# Patient Record
Sex: MALE | Race: WHITE | Employment: FULL TIME | URBAN - METROPOLITAN AREA
[De-identification: names, ages, dates, MRNs, and addresses within clinical notes are randomized per-mention and may not be internally consistent; named-entity substitution may affect disease eponyms.]

---

## 2022-04-06 ENCOUNTER — HOSPITAL ENCOUNTER (OUTPATIENT)
Age: 57
Setting detail: OUTPATIENT SURGERY
Discharge: HOME | End: 2022-04-06
Attending: INTERNAL MEDICINE | Admitting: INTERNAL MEDICINE
Payer: COMMERCIAL

## 2022-04-06 VITALS
RESPIRATION RATE: 16 BRPM | HEIGHT: 68 IN | HEART RATE: 78 BPM | SYSTOLIC BLOOD PRESSURE: 110 MMHG | OXYGEN SATURATION: 96 % | WEIGHT: 240.7 LBS | DIASTOLIC BLOOD PRESSURE: 71 MMHG | TEMPERATURE: 97.8 F | BODY MASS INDEX: 36.48 KG/M2

## 2022-04-06 PROCEDURE — 74011250636 HC RX REV CODE- 250/636: Performed by: INTERNAL MEDICINE

## 2022-04-06 PROCEDURE — 88305 TISSUE EXAM BY PATHOLOGIST: CPT

## 2022-04-06 PROCEDURE — 77030040361 HC SLV COMPR DVT MDII -B: Performed by: INTERNAL MEDICINE

## 2022-04-06 PROCEDURE — 76040000007: Performed by: INTERNAL MEDICINE

## 2022-04-06 PROCEDURE — 77030039961 HC KT CUST COLON BSC -D: Performed by: INTERNAL MEDICINE

## 2022-04-06 PROCEDURE — 2709999900 HC NON-CHARGEABLE SUPPLY: Performed by: INTERNAL MEDICINE

## 2022-04-06 PROCEDURE — G0500 MOD SEDAT ENDO SERVICE >5YRS: HCPCS | Performed by: INTERNAL MEDICINE

## 2022-04-06 PROCEDURE — 77030021593 HC FCPS BIOP ENDOSC BSC -A: Performed by: INTERNAL MEDICINE

## 2022-04-06 RX ORDER — SODIUM CHLORIDE 9 MG/ML
125 INJECTION, SOLUTION INTRAVENOUS CONTINUOUS
Status: DISCONTINUED | OUTPATIENT
Start: 2022-04-06 | End: 2022-04-06 | Stop reason: HOSPADM

## 2022-04-06 RX ORDER — FENTANYL CITRATE 50 UG/ML
INJECTION, SOLUTION INTRAMUSCULAR; INTRAVENOUS AS NEEDED
Status: DISCONTINUED | OUTPATIENT
Start: 2022-04-06 | End: 2022-04-06 | Stop reason: HOSPADM

## 2022-04-06 RX ORDER — ROSUVASTATIN CALCIUM 40 MG/1
40 TABLET, COATED ORAL DAILY
COMMUNITY
Start: 2022-03-22

## 2022-04-06 RX ORDER — ROPINIROLE 0.25 MG/1
0.25 TABLET, FILM COATED ORAL 3 TIMES DAILY
COMMUNITY

## 2022-04-06 RX ORDER — LANOLIN ALCOHOL/MO/W.PET/CERES
CREAM (GRAM) TOPICAL
COMMUNITY

## 2022-04-06 RX ORDER — CHLORPHENIRAMINE MALEATE 4 MG
4 TABLET ORAL
COMMUNITY

## 2022-04-06 RX ORDER — MULTIVIT WITH MINERALS/HERBS
1 TABLET ORAL DAILY
COMMUNITY

## 2022-04-06 RX ORDER — SPIRONOLACTONE 25 MG
600 TABLET ORAL
COMMUNITY

## 2022-04-06 RX ORDER — MIDAZOLAM HYDROCHLORIDE 1 MG/ML
INJECTION, SOLUTION INTRAMUSCULAR; INTRAVENOUS AS NEEDED
Status: DISCONTINUED | OUTPATIENT
Start: 2022-04-06 | End: 2022-04-06 | Stop reason: HOSPADM

## 2022-04-06 RX ORDER — BISMUTH SUBSALICYLATE 262 MG
1 TABLET,CHEWABLE ORAL DAILY
COMMUNITY

## 2022-04-06 RX ORDER — ATOMOXETINE 80 MG/1
80 CAPSULE ORAL DAILY
COMMUNITY
Start: 2022-02-18

## 2022-04-06 NOTE — DISCHARGE INSTRUCTIONS
Yasmany Browncarmen  [id number]  1965    EGD DISCHARGE INSTRUCTIONS  Discomfort:  Sore throat- throat lozenges or warm salt water gargle  redness at IV site- apply warm compress to area; if redness or soreness persist- contact your physician  Gaseous discomfort- walking, belching will help relieve any discomfort  You may not operate a vehicle until the next day  You may not engage in an occupation involving machinery or appliances until the next day  You may not drink alcoholic beverages until the next day  Avoid making any critical decisions for at least 24 hour    DIET   You may not resume your regular diet. Antireflux diet. ACTIVITY  You may not resume your normal daily activities   Spend the remainder of the day resting -  avoid any strenuous activity. CALL M.D. ANY SIGN OF   Increasing pain, nausea, vomiting  Abdominal distension (swelling)  New increased bleeding (oral or rectal)  Fever (chills)  Pain in chest area  Bloody discharge from nose or mouth  Shortness of breath     You may not take any Advil, Aspirin, Ibuprofen, Motrin, Aleve, or Goodys  ONLY  Tylenol as needed for pain. Follow-up Instructions:   Would still need to have his colonoscopy as planned     Carlie Staley MD  April 6, 2022      DISCHARGE SUMMARY from Nurse    PATIENT INSTRUCTIONS:    After general anesthesia or intravenous sedation, for 24 hours or while taking prescription Narcotics:  · Limit your activities  · Do not drive and operate hazardous machinery  · Do not make important personal or business decisions  · Do  not drink alcoholic beverages  · If you have not urinated within 8 hours after discharge, please contact your surgeon on call.     Report the following to your surgeon:  · Excessive pain, swelling, redness or odor of or around the surgical area  · Temperature over 100.5  · Nausea and vomiting lasting longer than 4 hours or if unable to take medications  · Any signs of decreased circulation or nerve impairment to extremity: change in color, persistent  numbness, tingling, coldness or increase pain  · Any questions    What to do at Home:  Recommended activity: Activity as tolerated and no driving for today. If you experience any of the following symptoms as above, please follow up with Dr. Derrick Lee. *  Please give a list of your current medications to your Primary Care Provider. *  Please update this list whenever your medications are discontinued, doses are      changed, or new medications (including over-the-counter products) are added. *  Please carry medication information at all times in case of emergency situations. These are general instructions for a healthy lifestyle:    No smoking/ No tobacco products/ Avoid exposure to second hand smoke  Surgeon General's Warning:  Quitting smoking now greatly reduces serious risk to your health. Obesity, smoking, and sedentary lifestyle greatly increases your risk for illness    A healthy diet, regular physical exercise & weight monitoring are important for maintaining a healthy lifestyle    You may be retaining fluid if you have a history of heart failure or if you experience any of the following symptoms:  Weight gain of 3 pounds or more overnight or 5 pounds in a week, increased swelling in our hands or feet or shortness of breath while lying flat in bed. Please call your doctor as soon as you notice any of these symptoms; do not wait until your next office visit. The discharge information has been reviewed with the patient and spouse. The patient and spouse verbalized understanding. Discharge medications reviewed with the patient and spouse and appropriate educational materials and side effects teaching were provided.   ___________________________________________________________________________________________________________________________________    Patient armband removed and shredded

## 2022-04-06 NOTE — PROCEDURES
(EGD) Esophagogastroduodenoscopy (UPPER ENDOSCOPY) Procedure Note  [facility] [place]  __________________________________________________________________________________________________________________________      4/6/2022     Patient: Renaldo Roldan YOB: 1965 Gender: male Age: 49 y.o. INDICATION:  : Pt of Dr. Celia Skiff, referred to [facility] for new onset of mild ALFIE, that was identified in July of 2021, with no surgical hx, no blood seen in stool or hematemesis, or obvious source of bleeding and no Upper or lower GI symptoms. No prior hx of PUD. Denied taking NSAID's. S/S of Anemia: Fatigue  *Lab Results: (Referral Documents)-  1/13/2021: No Anemia Noted  7/27/2021: RBC- , HGB- 12.5L, HCT- 36.5L  8/16/2021: RBC- , HGB- 12.5L, HCT- 36.5L, MCV- 93.1, ESR- 7  Iron- 112, %Sat- 30, TIBC- 370, Ferritin- 28L, Folate- >20, Vit B12- 833  Never had an EGD before. No recent blood tests  Last colonoscopy (1st exam) was done about 8yrs ago @[facility] [place]: Negative exam, 10yr Recall (Per Pt). -Old records unavailable at this time, any records recovered will be scanned to chart at later date. No FHx of (CRC) colorectal cancer, or Gastric Ulcers. Asymptomatic of GI complaints. BM: 1/day. No reported hx of abdominal surgeries, strokes, or CAD. : Florencia Dawson MD    Referring Provider:  Evelin Elliott MD    Sedation:  Versed 7 mg IV, Fentanyl 100 mcg IV    Procedure Details:  After infomed consent was obtained for the procedure, with all risks and benefits of procedure explained to the family the patient was taken to the endoscopy suite and placed in the left lateral decubitus position. Following sequential administration of sedation as per above, the endoscope was inserted into the mouth and advanced under direct vision to third portion of the duodenum.   A careful inspection was made as the gastroscope was withdrawn, including a retroflexed view of the proximal stomach; findings and interventions are described below. OROPHARYNX: The vocal Cords and the larynx are normal.   ESOPHAGUS: The proximal and mid oesophagus are normal. There is mild distal esophageal stenosis but the patient claimed that he has no dysphagia. The Z-Line is intact. No Hiatal hernia. Diaphragmatic opening or notch is located at 40 cm. STOMACH: No evidence of blood, fluid or solid food retention. The fundus on antegrade and retroflex views is normal. The cardia, body, lesser curvature, greater curvature, the antrum, and pylorus are normal. The gastric mucosa is normal.  DUODENUM: The bulb and major papilla are normal. There is mild non specific serpiginous like duodnenits in the second and third portions of the duodenum. 4 biopsies taken. Bleeds easily. PROXIMAL JEJUNUM:  Not examined. Therapies:  4 Duodenal biopsies     Specimen: one           Complications:   None    EBL: Negligible. IMPLANTS: * No implants in log *  IMPRESSION: Mild distal esophageal stenosis but the patient denied having dysphagia. The Z-Line is intact. No Hiatal hernia. There is mild non specific serpiginous like duodnenits in the second and third portions of the duodenum. 4 biopsies taken. Bleeds easily after each biopsy. RECOMMENDATION:  May resume regular diet. Chew well food. Avoid all NSAID's. Make a FU appointment at the office. Do the colonoscopy as planned.  Need to repeat CBC, iron profile, Ferritin and CMP  Assistant: None    --Jessi Qiuñonez MD on 4/6/2022 at 4:35 PM

## 2022-04-06 NOTE — H&P
Assessment/Plan  # Detail Type Description    1. Assessment Iron deficiency anemia, unspecified (D50.9). Impression 12/30/2021:   Pt of Dr. Ronaldo Durand, referred to [facility] for evaluation and treatment of new onset of ALFIE, that was identified in July of 2021, with no surgical hx, no blood seen in stool or hematemesis, or obvious source of bleeding.  _________________________________  *S/S of Anemia: Fatigue  *Lab Results: (Referral Documents)-  1/13/2021: No Anemia Noted  7/27/2021: RBC- , HGB- 12.5L, HCT- 36.5L  8/16/2021: RBC- , HGB- 12.5L, HCT- 36.5L, MCV- 93.1, ESR- 7  Iron- 112, %Sat- 30, TIBC- 370, Ferritin- 38L, Folate- >20, Vit B12- 833  _________________________________  *Never had an EGD before. *Last colonoscopy (1st exam) was done about 8yrs ago @[facility] [place]: Negative exam, 10yr Recall (Per Pt). -Old records unavailable at this time, any records recovered will be scanned to chart at later date.  _________________________________  Average risk, no FHx of (CRC) colorectal cancer, or Gastric Ulcers. Asymptomatic of GI complaints. BM: 1/day. No reported hx of abdominal surgeries, strokes, or CAD. Patient Plan 12/30/2021:  *Overall Plan:   1) EGD  2) Proceed w/C-scope if EGD is non-contributory (May cancel, if not needed after EGD)  _________________________________________  *EGD Plan: (Schedule 1st)  Will proceed with EGD with Dr. Susannah Cardona, to evaluate upper GI tract for abnormalities, evidence of bleeding, and Amador's epithelium with biopsies as indicated. *EGD Risks:  Explained risks of procedure to include bleeding, infection, reaction to sedation, and perforation with possible need for admission to the hospital, and in the most extensive of  circumstances, the patient may require surgery.  Pt verbalized understanding of these risks and is agreeable with this procedure.  ______________________________________  *C-scope Plan: (Schedule 2nd) - Proceed w/C-scope if EGD is non-contributory  Colonoscopy ordered with Dr. Lima Gardner with Miralax bowel prep, and Mag Citrate 2 days before prep, and Miralax and stool softeners starting 3 days before prep.  -Patient is advised to take Thyroid meds, BP meds, beta blocker and any cardiac meds the AM of procedure with sip clear liquid after prep.  -Pt must be off iron supplements at least 7 days prior to procedure. *C-scope Risks:  Stressed importance of following all bowel preparation instructions. Explained the procedure to the patient including all risks and benefits. These risks consist of missed lesions on exam, bleeding, and bowel perforation with possible need for admission to the hospital, and in the most extensive of  circumstances, the patient may require surgery. Pt verbalized understanding of these risks and is agreeable with this procedure. Plan Orders Further diagnostic evaluations ordered today include(s) DIAGNOSTIC COLONOSCOPY to be performed. and UPPER GI ENDOSCOPY, DIAGNOSIS to be performed. 2. Assessment Long term (current) use of non-steroidal anti-inflammatories (NSAID) (Z79.1). Impression Chronic NSAID use: Taking 400mg Ibuprofen BID (800mg Daily) for Left Elbow pain. 3. Assessment Body mass index [BMI] 37.0-37.9, adult (Z68.37). Impression BMI: 37.40, Pt is obese with an Average Frame. These factors increase risks of procedural complications with sedation, and compromise airway maintenance. Special attention to airway management. This 49year old male presents for Anemia. History of Present Illness  1. Anemia   The symptom(s) began 4 months ago. Type of anemia was acquired for deficiency anemia (iron deficient). The problem is a new diagnosis. Associated symptoms include fatigue. Pertinent negatives include dizziness and weakness. Additional information: Pt stated that his last colonoscopy  was approx  8 yrs ago [facility]  Normal exam. Bm.  1 daily.            Comments: 2021 HGB 12.5, hematocrit 36.5, Ferritin 28    Past Medical/Surgical History   (Detailed)  Disease/disorder Onset Date Management Date Comments   Chronic NSAID use             Family History   (Detailed)    Relationship Family Member Name  Age at Death Condition Onset Age Cause of Death       No family history of (CRC) colorectal cancer, or Gastric ulcers. N     Social History  (Detailed)  Tobacco use reviewed. Preferred language is [place]. Marital Status/Family/Social Support  Marital status:      Alcohol  There is a history of alcohol use. Caffeine  The patient uses caffeine. Medications (active prior to today)  Medication Instructions Start Date Stop Date Refilled Elsewhere   Crestor 40 mg tablet take 1 tablet by oral route  every day //   Y   atomoxetine 80 mg capsule take 1 capsule by oral route  every day //   Y   ropinirole 0.5 mg tablet take 1 tablet by oral route 3 times every day //   Y   Chlor-Trimeton 4 mg tablet take 1 tablet by oral route  every 4 hours as needed //   Y   vitamin B complex capsule  //   Y       Medication Reconciliation  Medications reconciled today.     Medication Reviewed  Adherence Medication Name Sig Desc Elsewhere Status   taking as directed Crestor 40 mg tablet take 1 tablet by oral route  every day Y Verified   taking as directed atomoxetine 80 mg capsule take 1 capsule by oral route  every day Y Verified   taking as directed ropinirole 0.5 mg tablet take 1 tablet by oral route 3 times every day Y Verified   taking as directed Chlor-Trimeton 4 mg tablet take 1 tablet by oral route  every 4 hours as needed Y Verified   taking as directed vitamin B complex capsule  Y Verified     Medications (Added, Continued or Stopped today)  Start Date Medication Directions PRN Status PRN Reason Instruction Stop Date    atomoxetine 80 mg capsule take 1 capsule by oral route  every day N       Chlor-Trimeton 4 mg tablet take 1 tablet by oral route  every 4 hours as needed N       Crestor 40 mg tablet take 1 tablet by oral route  every day N       ropinirole 0.5 mg tablet take 1 tablet by oral route 3 times every day N       vitamin B complex capsule  N        Allergies  Ingredient Reaction (Severity) Medication Name Comment   NO KNOWN ALLERGIES        Reviewed, no changes. Orders  Status Lab Order Time Frame Comments   ordered UPPER GI ENDOSCOPY, DIAGNOSIS     ordered DIAGNOSTIC COLONOSCOPY         Review of Systems  System Neg/Pos Details   Constitutional Positive Fatigue. Constitutional Negative Fever and Malaise. ENMT Negative Ear infections, Nasal congestion, Sinus Infection and Sore throat. Eyes Negative Double vision and Eye pain. Respiratory Negative Asthma, Chronic cough, Pleuritic pain and Wheezing. Cardio Negative Edema and Irregular heartbeat/palpitations. GI Negative Change in bowel habits, Decreased appetite, Dysphagia, Heartburn, Hematemesis, Hematochezia, Melena and Reflux.  Negative Dysuria, Hematuria, Urinary frequency, Urinary incontinence and Urinary retention. Endocrine Negative Gynecomastia, Heat intolerance and Increased thirst.   Neuro Negative Dizziness, Tremors and Vertigo. Psych Negative Anxiety and Increased stress. Integumentary Negative Hives, Pruritus and Rash. MS Negative Back pain, Myalgia and Weakness. Hema/Lymph Negative Easy bleeding, Easy bruising and Lymphadenopathy. Allergic/Immuno Negative Chemicals in work place, Contact allergy, Food allergies, Immunosuppression and Seasonal allergies. Reproductive Negative Penile discharge and Sexual dysfunction. Vital Signs   Height  Time ft in cm Last Measured Height Position   10:50 AM 5.0 8.00 172.72       Weight/BSA/BMI  Time lb oz kg Context BMI kg/m2 BSA m2   10:50 .00  111. 584 dressed with shoes 37.40      BP (mmHg) BP Patient Position Resp SpO2 O2 Device O2 Flow Rate (L/min) Level of Consciousness MEWS Score Weight       04/06/22 1351 97.6 °F (36.4 °C) 86 120/73   14 98 % None (Room air)  Alert (0) 0 109.2 kg (240 lb 11.2 oz)       Physical  Exam  Exam Findings Details   Constitutional Normal Well developed. Eyes Normal Conjunctiva - Right: Normal, Left: Normal. Sclera - Right: Normal, Left: Normal.   Nasopharynx Normal Lips/teeth/gums - Normal.   Neck Exam Normal Inspection - Normal.   Respiratory Normal Inspection - Normal.   Cardiovascular Normal Rhythm - Regular. Extra sounds - None. Murmurs - None. Vascular Normal Pulses - Brachial: Normal.   Skin Normal Inspection - Normal.   Musculoskeletal Normal Hands/Wrist - Right: Normal, Left: Normal.   Extremity Normal No edema. Neurological Normal Fine motor skills - Normal.   Psychiatric Normal Orientation - Oriented to time, place, person & situation. Appropriate mood and affect.      Active Patient Care Team Members  Name Contact Agency Type Support Role Relationship Active Date Inactive Date Specialty   Chelsea Claude   Patient provider PCP      Christy Eduardo   encounter provider    Gastroenterology       No change in H&P

## 2022-04-25 ENCOUNTER — HOSPITAL ENCOUNTER (OUTPATIENT)
Age: 57
Setting detail: OUTPATIENT SURGERY
Discharge: HOME | End: 2022-04-25
Attending: INTERNAL MEDICINE | Admitting: INTERNAL MEDICINE
Payer: COMMERCIAL

## 2022-04-25 VITALS
SYSTOLIC BLOOD PRESSURE: 124 MMHG | BODY MASS INDEX: 36.22 KG/M2 | RESPIRATION RATE: 14 BRPM | TEMPERATURE: 98.2 F | HEIGHT: 68 IN | WEIGHT: 239 LBS | HEART RATE: 70 BPM | DIASTOLIC BLOOD PRESSURE: 71 MMHG | OXYGEN SATURATION: 99 %

## 2022-04-25 PROCEDURE — G0500 MOD SEDAT ENDO SERVICE >5YRS: HCPCS | Performed by: INTERNAL MEDICINE

## 2022-04-25 PROCEDURE — 88305 TISSUE EXAM BY PATHOLOGIST: CPT

## 2022-04-25 PROCEDURE — 77030039961 HC KT CUST COLON BSC -D: Performed by: INTERNAL MEDICINE

## 2022-04-25 PROCEDURE — 2709999900 HC NON-CHARGEABLE SUPPLY: Performed by: INTERNAL MEDICINE

## 2022-04-25 PROCEDURE — 77030013991 HC SNR POLYP ENDOSC BSC -A: Performed by: INTERNAL MEDICINE

## 2022-04-25 PROCEDURE — 76040000008: Performed by: INTERNAL MEDICINE

## 2022-04-25 PROCEDURE — 99153 MOD SED SAME PHYS/QHP EA: CPT | Performed by: INTERNAL MEDICINE

## 2022-04-25 PROCEDURE — 77030040361 HC SLV COMPR DVT MDII -B: Performed by: INTERNAL MEDICINE

## 2022-04-25 PROCEDURE — 74011250636 HC RX REV CODE- 250/636: Performed by: INTERNAL MEDICINE

## 2022-04-25 RX ORDER — FENTANYL CITRATE 50 UG/ML
100 INJECTION, SOLUTION INTRAMUSCULAR; INTRAVENOUS
Status: DISCONTINUED | OUTPATIENT
Start: 2022-04-25 | End: 2022-04-25 | Stop reason: HOSPADM

## 2022-04-25 RX ORDER — NALOXONE HYDROCHLORIDE 0.4 MG/ML
0.4 INJECTION, SOLUTION INTRAMUSCULAR; INTRAVENOUS; SUBCUTANEOUS
Status: DISCONTINUED | OUTPATIENT
Start: 2022-04-25 | End: 2022-04-25 | Stop reason: HOSPADM

## 2022-04-25 RX ORDER — DEXTROMETHORPHAN/PSEUDOEPHED 2.5-7.5/.8
1.2 DROPS ORAL
Status: CANCELLED | OUTPATIENT
Start: 2022-04-25

## 2022-04-25 RX ORDER — DIPHENHYDRAMINE HYDROCHLORIDE 50 MG/ML
50 INJECTION, SOLUTION INTRAMUSCULAR; INTRAVENOUS ONCE
Status: CANCELLED | OUTPATIENT
Start: 2022-04-25 | End: 2022-04-25

## 2022-04-25 RX ORDER — ATROPINE SULFATE 0.1 MG/ML
0.5 INJECTION INTRAVENOUS
Status: CANCELLED | OUTPATIENT
Start: 2022-04-25 | End: 2022-04-26

## 2022-04-25 RX ORDER — SODIUM CHLORIDE 0.9 % (FLUSH) 0.9 %
5-40 SYRINGE (ML) INJECTION EVERY 8 HOURS
Status: CANCELLED | OUTPATIENT
Start: 2022-04-25

## 2022-04-25 RX ORDER — EPINEPHRINE 0.1 MG/ML
1 INJECTION INTRACARDIAC; INTRAVENOUS
Status: CANCELLED | OUTPATIENT
Start: 2022-04-25 | End: 2022-04-26

## 2022-04-25 RX ORDER — SODIUM CHLORIDE 0.9 % (FLUSH) 0.9 %
5-40 SYRINGE (ML) INJECTION AS NEEDED
Status: CANCELLED | OUTPATIENT
Start: 2022-04-25

## 2022-04-25 RX ORDER — MIDAZOLAM HYDROCHLORIDE 1 MG/ML
.25-5 INJECTION, SOLUTION INTRAMUSCULAR; INTRAVENOUS
Status: DISCONTINUED | OUTPATIENT
Start: 2022-04-25 | End: 2022-04-25 | Stop reason: HOSPADM

## 2022-04-25 RX ORDER — SODIUM CHLORIDE 9 MG/ML
1000 INJECTION, SOLUTION INTRAVENOUS CONTINUOUS
Status: DISCONTINUED | OUTPATIENT
Start: 2022-04-25 | End: 2022-04-25 | Stop reason: HOSPADM

## 2022-04-25 RX ORDER — FLUMAZENIL 0.1 MG/ML
0.2 INJECTION INTRAVENOUS
Status: DISCONTINUED | OUTPATIENT
Start: 2022-04-25 | End: 2022-04-25 | Stop reason: HOSPADM

## 2022-04-25 RX ADMIN — SODIUM CHLORIDE 1000 ML: 9 INJECTION, SOLUTION INTRAVENOUS at 13:06

## 2022-04-25 NOTE — DISCHARGE INSTRUCTIONS
Izaiah Cruz  [id number]  1965    COLON DISCHARGE INSTRUCTIONS    Discomfort:  Redness at IV site- apply warm compress to area; if redness or soreness persist- contact your physician  There may be a slight amount of blood passed from the rectum  Gaseous discomfort- walking, belching will help relieve any discomfort  You may not operate a vehicle til the next day. You may not engage in an occupation involving machinery or appliances til the next day. You may not drink alcoholic beverages til the next day. DIET:   High fiber diet. ACTIVITY:  You may not  resume your normal daily activities til the next day. it is recommended that you spend the remainder of the day resting -  avoid any strenuous activity. CALL M.D.  IF ANY SIGN OF:   Increasing pain, nausea, vomiting  Abdominal distension (swelling)  New increased bleeding (oral or rectal)  Fever (chills)  Pain in chest area  Bloody discharge from nose or mouth  Shortness of breath    You may not  take any Advil, Aspirin, Ibuprofen, Motrin, Aleve, or Goodys for 5 days, ONLY  Tylenol as needed for pain. Post procedure diagnosis:  sigmoid diverticulosis; redundant colon; polyp    Follow-up Instructions: Your follow up colonoscopy will be in 10 years. We will notify you the results of your biopsy by letter within 2 weeks. Solitario Manning MD  April 25, 2022     DISCHARGE SUMMARY from Nurse    PATIENT INSTRUCTIONS:    After general anesthesia or intravenous sedation, for 24 hours or while taking prescription Narcotics:  · Limit your activities  · Do not drive and operate hazardous machinery  · Do not make important personal or business decisions  · Do  not drink alcoholic beverages  · If you have not urinated within 8 hours after discharge, please contact your surgeon on call.     Report the following to your surgeon:  · Excessive pain, swelling, redness or odor of or around the surgical area  · Temperature over 100.5  · Nausea and vomiting lasting longer than 4 hours or if unable to take medications  · Any signs of decreased circulation or nerve impairment to extremity: change in color, persistent  numbness, tingling, coldness or increase pain  · Any questions    What to do at Home:  Recommended activity: Activity as tolerated, as above    If you experience any of the following symptoms as above, please follow up with Dr. Bang Flores. *  Please give a list of your current medications to your Primary Care Provider. *  Please update this list whenever your medications are discontinued, doses are      changed, or new medications (including over-the-counter products) are added. *  Please carry medication information at all times in case of emergency situations. These are general instructions for a healthy lifestyle:    No smoking/ No tobacco products/ Avoid exposure to second hand smoke  Surgeon General's Warning:  Quitting smoking now greatly reduces serious risk to your health. Obesity, smoking, and sedentary lifestyle greatly increases your risk for illness    A healthy diet, regular physical exercise & weight monitoring are important for maintaining a healthy lifestyle    You may be retaining fluid if you have a history of heart failure or if you experience any of the following symptoms:  Weight gain of 3 pounds or more overnight or 5 pounds in a week, increased swelling in our hands or feet or shortness of breath while lying flat in bed. Please call your doctor as soon as you notice any of these symptoms; do not wait until your next office visit. The discharge information has been reviewed with the patient and spouse. The patient and spouse verbalized understanding. Discharge medications reviewed with the patient and spouse and appropriate educational materials and side effects teaching were provided.   _Patient armband removed and shredded  __________________________________________________________________________________________________________________________________

## 2022-04-25 NOTE — PROCEDURES
[facility]  Colonoscopy Procedure Report  _______________________________________________________  Patient: Lolita Gomes                                        Attending Physician: Jessica Yousif MD    Patient ID: [id number]                                    Referring Physician: Carolynn Lindsey MD    Exam Date: 4/25/2022      Introduction: A  49 y.o. male patient, presents for inpatient Colonoscopy    Indications: Pt of Dr. Saintclair Peek, referred to [facility] for evaluation and treatment of new onset of ALFIE, that was identified in July of 2021, with no surgical hx, no blood seen in stool or hematemesis, or obvious source of bleeding. S/S of Anemia: Fatigue  Lab Results: (Referral Documents)-  1/13/2021: No Anemia Noted  7/27/2021: RBC- , HGB- 12.5L, HCT- 36.5L  8/16/2021: RBC- , HGB- 12.5L, HCT- 36.5L, MCV- 93.1, ESR- 7  Iron- 112, %Sat- 30, TIBC- 370, Ferritin- 38L, Folate- >20, Vit B12- 833  Never had an EGD before. Last colonoscopy (1st exam) was done about 8yrs ago @[facility] [place]: Negative exam, 10yr Recall (Per Pt). -Old records unavailable at this time, any records recovered will be scanned to chart at later date. Average risk, no FHx of (CRC) colorectal cancer, or Gastric Ulcers. Asymptomatic of GI complaints. BM: 1/day. Consent: The benefits, risks, and alternatives to the procedure were discussed and informed consent was obtained from the patient. Preparation: EKG, pulse, pulse oximetry and blood pressure were monitored throughout the procedure. ASA Classification: Class I- . The heart is an S1-S2 and regular heart rate and rhythm. Lungs are clear to auscultation and percussion. Abdomen is soft, nondistended, and nontender. Mental Status: awake, alert, and oriented to person, place, and time    Medications:  · Fentanyl 100 mcg IV before procedure. · Versed 5 mg IV throughout the procedure. Rectal Exam: Normal Rectal Exam. No Blood. Prostate not enlarged.     Pathology Specimens: 1    Procedure: The colonoscope was passed with difficulty through the anus under direct visualization and advanced to the cecum. The patient required positioning on the back and external counter pressure to aid in the passage of the scope. The scope was withdrawn and the mucosa was carefully examined. The quality of the preparation was good. The views were very good. The patient's toleration of the procedure was very good. The exam was done twice to the cecum. Total time is 35 minutes and withdrawal time is 24 minutes. Findings:    Rectum:   Small internal hemorrhoids. Sigmoid:   Very long and redundant sigmoid with moderate sigmoid diverticulosis  Descending Colon:   Normal   Transverse Colon:   Mild transverse colon diverticulosis. Small 5 mm sessile polyp in the proximal transverse colon, cold snared  Ascending Colon:   Normal  Cecum:   Normal  Terminal Ileum:   Not entered. Unplanned Events: There were no unplanned events. Estimated Blood Loss: Negligible  IMPLANTS: * No implants in log *    Impressions: Small internal hemorrhoids. Difficult colonoscopy. Long redundant and loopy colon. The sigmoid colon is very long and redundant with moderate sigmoid diverticulosis. Mild transverse colon diverticulosis. Small 5 mm sessile polyp in the proximal transverse colon, cold snared. Normal Mucosa. No blood or AVM found. Complications: None; patient tolerated the procedure well. Recommendations:  · Discharge home when standard parameters are met. · Resume a high fiber diet. · Resume own medications. Avoid all NSAID's for 5 days. · Colonoscopy recommendation in 10 years. · Take Miralax and/ or Colace 100 mg on regular basis if constipated  · Need to check the CBC, Iron profile, H Pylori and occult blood in the stool.     Procedure Codes:    [facility] [[id number]]    Endoscope Information:  Model Number(s)    [id number]   Assistant: None  Signed By: Javier Gonzáles MD Date: 4/25/2022

## 2022-04-25 NOTE — H&P
Assessment/Plan  # Detail Type Description    1. Assessment Iron deficiency anemia, unspecified (D50.9). Impression 12/30/2021:   Pt of Dr. Farhan Alanis, referred to [facility] for evaluation and treatment of new onset of ALFIE, that was identified in July of 2021, with no surgical hx, no blood seen in stool or hematemesis, or obvious source of bleeding.  _________________________________  *S/S of Anemia: Fatigue  *Lab Results: (Referral Documents)-  1/13/2021: No Anemia Noted  7/27/2021: RBC- , HGB- 12.5L, HCT- 36.5L  8/16/2021: RBC- , HGB- 12.5L, HCT- 36.5L, MCV- 93.1, ESR- 7  Iron- 112, %Sat- 30, TIBC- 370, Ferritin- 38L, Folate- >20, Vit B12- 833  _________________________________  *Never had an EGD before. *Last colonoscopy (1st exam) was done about 8yrs ago @[place]: Negative exam, 10yr Recall (Per Pt). -Old records unavailable at this time, any records recovered will be scanned to chart at later date.  _________________________________  Average risk, no FHx of (CRC) colorectal cancer, or Gastric Ulcers. Asymptomatic of GI complaints. BM: 1/day. No reported hx of abdominal surgeries, strokes, or CAD. Patient Plan 12/30/2021:  *Overall Plan:   1) EGD  2) Proceed w/C-scope if EGD is non-contributory (May cancel, if not needed after EGD)  _________________________________________  *EGD Plan: (Schedule 1st)  Will proceed with EGD with Dr. Bang Flores, to evaluate upper GI tract for abnormalities, evidence of bleeding, and Amador's epithelium with biopsies as indicated. *EGD Risks:  Explained risks of procedure to include bleeding, infection, reaction to sedation, and perforation with possible need for admission to the hospital, and in the most extensive of  circumstances, the patient may require surgery.  Pt verbalized understanding of these risks and is agreeable with this procedure.  ______________________________________  *C-scope Plan: (Schedule 2nd) - Proceed w/C-scope if EGD is non-contributory  Colonoscopy ordered with Dr. Cristel Tompikns with Miralax bowel prep, and Mag Citrate 2 days before prep, and Miralax and stool softeners starting 3 days before prep.  -Patient is advised to take Thyroid meds, BP meds, beta blocker and any cardiac meds the AM of procedure with sip clear liquid after prep.  -Pt must be off iron supplements at least 7 days prior to procedure. *C-scope Risks:  Stressed importance of following all bowel preparation instructions. Explained the procedure to the patient including all risks and benefits. These risks consist of missed lesions on exam, bleeding, and bowel perforation with possible need for admission to the hospital, and in the most extensive of  circumstances, the patient may require surgery. Pt verbalized understanding of these risks and is agreeable with this procedure. Plan Orders Further diagnostic evaluations ordered today include(s) DIAGNOSTIC COLONOSCOPY to be performed. and UPPER GI ENDOSCOPY, DIAGNOSIS to be performed. 2. Assessment Long term (current) use of non-steroidal anti-inflammatories (NSAID) (Z79.1). Impression Chronic NSAID use: Taking 400mg Ibuprofen BID (800mg Daily) for Left Elbow pain. 3. Assessment Body mass index [BMI] 37.0-37.9, adult (Z68.37). Impression BMI: 37.40, Pt is obese with an Average Frame. These factors increase risks of procedural complications with sedation, and compromise airway maintenance. Special attention to airway management. This 49year old male presents for Anemia. History of Present Illness  1. Anemia   The symptom(s) began 4 months ago. Type of anemia was acquired for deficiency anemia (iron deficient). The problem is a new diagnosis. Associated symptoms include fatigue. Pertinent negatives include dizziness and weakness. Additional information: Pt stated that his last colonoscopy  was approx  8 yrs ago [facility]  Normal exam. Bm.  1 daily.            Comments: 2021 HGB 12.5, hematocrit 36.5, Ferritin 28    Past Medical/Surgical History   (Detailed)  Disease/disorder Onset Date Management Date Comments   Chronic NSAID use             Family History   (Detailed)    Relationship Family Member Name  Age at Death Condition Onset Age Cause of Death       No family history of (CRC) colorectal cancer, or Gastric ulcers. N     Social History  (Detailed)  Tobacco use reviewed. Preferred language is [place]. Marital Status/Family/Social Support  Marital status:      Alcohol  There is a history of alcohol use. Caffeine  The patient uses caffeine. Medications (active prior to today)  Medication Instructions Start Date Stop Date Refilled Elsewhere   Crestor 40 mg tablet take 1 tablet by oral route  every day //   Y   atomoxetine 80 mg capsule take 1 capsule by oral route  every day //   Y   ropinirole 0.5 mg tablet take 1 tablet by oral route 3 times every day //   Y   Chlor-Trimeton 4 mg tablet take 1 tablet by oral route  every 4 hours as needed //   Y   vitamin B complex capsule  //   Y       Medication Reconciliation  Medications reconciled today.     Medication Reviewed  Adherence Medication Name Sig Desc Elsewhere Status   taking as directed Crestor 40 mg tablet take 1 tablet by oral route  every day Y Verified   taking as directed atomoxetine 80 mg capsule take 1 capsule by oral route  every day Y Verified   taking as directed ropinirole 0.5 mg tablet take 1 tablet by oral route 3 times every day Y Verified   taking as directed Chlor-Trimeton 4 mg tablet take 1 tablet by oral route  every 4 hours as needed Y Verified   taking as directed vitamin B complex capsule  Y Verified     Medications (Added, Continued or Stopped today)  Start Date Medication Directions PRN Status PRN Reason Instruction Stop Date    atomoxetine 80 mg capsule take 1 capsule by oral route  every day N       Chlor-Trimeton 4 mg tablet take 1 tablet by oral route  every 4 hours as needed N       Crestor 40 mg tablet take 1 tablet by oral route  every day N       ropinirole 0.5 mg tablet take 1 tablet by oral route 3 times every day N       vitamin B complex capsule  N        Allergies  Ingredient Reaction (Severity) Medication Name Comment   NO KNOWN ALLERGIES        Reviewed, no changes. Orders  Status Lab Order Time Frame Comments   ordered UPPER GI ENDOSCOPY, DIAGNOSIS     ordered DIAGNOSTIC COLONOSCOPY         Review of Systems  System Neg/Pos Details   Constitutional Positive Fatigue. Constitutional Negative Fever and Malaise. ENMT Negative Ear infections, Nasal congestion, Sinus Infection and Sore throat. Eyes Negative Double vision and Eye pain. Respiratory Negative Asthma, Chronic cough, Pleuritic pain and Wheezing. Cardio Negative Edema and Irregular heartbeat/palpitations. GI Negative Change in bowel habits, Decreased appetite, Dysphagia, Heartburn, Hematemesis, Hematochezia, Melena and Reflux.  Negative Dysuria, Hematuria, Urinary frequency, Urinary incontinence and Urinary retention. Endocrine Negative Gynecomastia, Heat intolerance and Increased thirst.   Neuro Negative Dizziness, Tremors and Vertigo. Psych Negative Anxiety and Increased stress. Integumentary Negative Hives, Pruritus and Rash. MS Negative Back pain, Myalgia and Weakness. Hema/Lymph Negative Easy bleeding, Easy bruising and Lymphadenopathy. Allergic/Immuno Negative Chemicals in work place, Contact allergy, Food allergies, Immunosuppression and Seasonal allergies. Reproductive Negative Penile discharge and Sexual dysfunction. Vital Signs   Height  Time ft in cm Last Measured Height Position   10:50 AM 5.0 8.00 172.72       Weight/BSA/BMI  Time lb oz kg Context BMI kg/m2 BSA m2   10:50 .00  111. 584 dressed with shoes 37.40      04/25/22 1236 97.4 °F (36.3 °C) 76 123/79 --        Respiratory Therapy (last day)    Date/Time Resp SpO2 O2 Device O2 Flow Rate (L/min) [facility]   04/25/22 1236 16 100 % None (Room air) -- [facility] F[facility]ILITY       Physical  Exam  Exam Findings Details   Constitutional Normal Well developed. Eyes Normal Conjunctiva - Right: Normal, Left: Normal. Sclera - Right: Normal, Left: Normal.   Nasopharynx Normal Lips/teeth/gums - Normal.   Neck Exam Normal Inspection - Normal.   Respiratory Normal Inspection - Normal.   Cardiovascular Normal Rhythm - Regular. Extra sounds - None. Murmurs - None. Vascular Normal Pulses - Brachial: Normal.   Skin Normal Inspection - Normal.   Musculoskeletal Normal Hands/Wrist - Right: Normal, Left: Normal.   Extremity Normal No edema. Neurological Normal Fine motor skills - Normal.   Psychiatric Normal Orientation - Oriented to time, place, person & situation. Appropriate mood and affect.      No change in H&P

## (undated) DEVICE — SPONGE GZ W4XL4IN COT 12 PLY TYP VII WVN C FLD DSGN

## (undated) DEVICE — NDL FLTR TIP 5 MIC 18GX1.5IN --

## (undated) DEVICE — TRAP SPEC COLL POLYP POLYSTYR --

## (undated) DEVICE — TUBING, SUCTION, 1/4" X 12', STRAIGHT: Brand: MEDLINE

## (undated) DEVICE — MAJ-1414 SINGLE USE ADPATER BIOPSY VALV: Brand: SINGLE USE ADAPTOR BIOPSY VALVE

## (undated) DEVICE — SINGLE PORT MANIFOLD: Brand: NEPTUNE 2

## (undated) DEVICE — TRNQT TEXT 1X18IN BLU LF DISP -- CONVERT TO ITEM 362165

## (undated) DEVICE — WRISTBAND ID AD W2.5XL9.5CM RED VYN ADH CLSR UNI-PRINT

## (undated) DEVICE — CATH IV SAFE STR 22GX1IN BLU -- PROTECTIV PLUS

## (undated) DEVICE — SYR 3ML LL TIP 1/10ML GRAD --

## (undated) DEVICE — GARMENT,MEDLINE,DVT,INT,CALF,MED, GEN2: Brand: MEDLINE

## (undated) DEVICE — KENDALL RADIOLUCENT FOAM MONITORING ELECTRODE RECTANGULAR SHAPE: Brand: KENDALL

## (undated) DEVICE — Device

## (undated) DEVICE — SOLUTION IV 500ML 0.9% SOD CHL FLX CONT

## (undated) DEVICE — FORCEPS BX CAP 240CM L RAD JAW 4

## (undated) DEVICE — SPONGE GZ W4XL4IN RAYON POLY 4 PLY NONWOVEN FASTER WICKING

## (undated) DEVICE — REM POLYHESIVE ADULT PATIENT RETURN ELECTRODE: Brand: VALLEYLAB

## (undated) DEVICE — MOUTHPIECE ENDOSCP 20X27MM --

## (undated) DEVICE — SET ADMIN 16ML TBNG L100IN 2 Y INJ SITE IV PIGGY BK DISP

## (undated) DEVICE — CANNULA CUSH AD W/ 14FT TBG

## (undated) DEVICE — NDL PRT INJ NSAF BLNT 18GX1.5 --

## (undated) DEVICE — SNARE POLYP SM W13MMXL240CM SHTH DIA2.4MM OVL FLX DISP

## (undated) DEVICE — CATH SUC CTRL PRT TRIFLO 14FR --

## (undated) DEVICE — SYR 5ML 1/5 GRAD LL NSAF LF --

## (undated) DEVICE — SYRINGE 50ML E/T